# Patient Record
(demographics unavailable — no encounter records)

---

## 2024-11-18 NOTE — HISTORY OF PRESENT ILLNESS
[FreeTextEntry1] : 68 year old woman presenting for CPE [de-identified] : She developed rash after the first couple of alendronate doses, so she discontinued.  She is willing to try different a bisphosphonate and prefers monthly dosing or weekly..  She thinks she had Tdap through [employer] but will check her records.  Due for colonoscopy, will call Dr. Castañeda's office  She had her labs done in September 2024 and we reviewed results.  A1c stable at 6.0, but LDL has risen to 170s.  She does not exercise that regularly.  Recommended trying to resume more regular aerobic exercise.  She agrees to start low-dose statin.  Will also schedule appointment with cardiologist Dr. Wheatley.

## 2024-11-18 NOTE — HEALTH RISK ASSESSMENT
[Patient reported colonoscopy was normal] : Patient reported colonoscopy was normal [0] : 2) Feeling down, depressed, or hopeless: Not at all (0) [PHQ-2 Negative - No further assessment needed] : PHQ-2 Negative - No further assessment needed [[id number]] : 0 [Never] : Never [ColonoscopyDate] : 1/14 [ColonoscopyComments] : Dr. Castañeda

## 2024-11-18 NOTE — HISTORY OF PRESENT ILLNESS
[FreeTextEntry1] : 68 year old woman presenting for CPE [de-identified] : She developed rash after the first couple of alendronate doses, so she discontinued.  She is willing to try different a bisphosphonate and prefers monthly dosing or weekly..  She thinks she had Tdap through [employer] but will check her records.  Due for colonoscopy, will call Dr. Castañeda's office  She had her labs done in September 2024 and we reviewed results.  A1c stable at 6.0, but LDL has risen to 170s.  She does not exercise that regularly.  Recommended trying to resume more regular aerobic exercise.  She agrees to start low-dose statin.  Will also schedule appointment with cardiologist Dr. Wheatley.

## 2024-11-18 NOTE — HEALTH RISK ASSESSMENT
[Patient reported colonoscopy was normal] : Patient reported colonoscopy was normal [0] : 2) Feeling down, depressed, or hopeless: Not at all (0) [PHQ-2 Negative - No further assessment needed] : PHQ-2 Negative - No further assessment needed [[id number]] : 0 [Never] : Never [ColonoscopyDate] : 1/14 [ColonoscopyComments] : Dr. Castañeda

## 2024-12-04 NOTE — REASON FOR VISIT
[FreeTextEntry1] :   CV Data: ECG 11/2024: bifascicular block, sinus ECG 4/2023: bifascicular block, sinus ECG 3/2021: bifascicular block, sinus

## 2024-12-04 NOTE — PHYSICAL EXAM
[Well Developed] : well developed [Well Nourished] : well nourished [No Acute Distress] : no acute distress [Normal Conjunctiva] : normal conjunctiva [Normal Venous Pressure] : normal venous pressure [No Carotid Bruit] : no carotid bruit [Normal S1, S2] : normal S1, S2 [No Rub] : no rub [No Gallop] : no gallop [Clear Lung Fields] : clear lung fields [Good Air Entry] : good air entry [No Respiratory Distress] : no respiratory distress  [Soft] : abdomen soft [Non Tender] : non-tender [No Masses/organomegaly] : no masses/organomegaly [Normal Bowel Sounds] : normal bowel sounds [Normal Gait] : normal gait [No Edema] : no edema [No Cyanosis] : no cyanosis [No Clubbing] : no clubbing [No Varicosities] : no varicosities [No Rash] : no rash [No Skin Lesions] : no skin lesions [Moves all extremities] : moves all extremities [No Focal Deficits] : no focal deficits [Normal Speech] : normal speech [Alert and Oriented] : alert and oriented [Normal memory] : normal memory [Murmur] : murmur [de-identified] : 1/6 best heard in the aortic position

## 2024-12-04 NOTE — HISTORY OF PRESENT ILLNESS
[FreeTextEntry1] : 68F w HLD, DMT2 ref for CVD prevention  12/4/24 NEW: no symptoms of CP, SOB, SOFIA, orthopnea, edema - known RBBB since 20 yrs ago. no presyncope, syncope  FH: CHF, HTN - parents; F - etoh-related HF SH: never smoker, no etoh, no drugs. 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. Lives in [facility] - Martiniqu[place] diet (unhealthy) - exercises 1x a week [employer] class